# Patient Record
Sex: MALE | URBAN - METROPOLITAN AREA
[De-identification: names, ages, dates, MRNs, and addresses within clinical notes are randomized per-mention and may not be internally consistent; named-entity substitution may affect disease eponyms.]

---

## 2023-09-11 ENCOUNTER — APPOINTMENT (OUTPATIENT)
Dept: URBAN - METROPOLITAN AREA CLINIC 250 | Age: 38
Setting detail: DERMATOLOGY
End: 2023-09-12

## 2023-09-11 DIAGNOSIS — L20.89 OTHER ATOPIC DERMATITIS: ICD-10-CM

## 2023-09-11 PROCEDURE — 99203 OFFICE O/P NEW LOW 30 MIN: CPT

## 2023-09-11 PROCEDURE — OTHER PRESCRIPTION: OTHER

## 2023-09-11 PROCEDURE — OTHER COUNSELING: OTHER

## 2023-09-11 PROCEDURE — OTHER MIPS QUALITY: OTHER

## 2023-09-11 PROCEDURE — OTHER OTC TREATMENT REGIMEN: OTHER

## 2023-09-11 RX ORDER — FLUOCINONIDE 0.5 MG/G
OINTMENT TOPICAL
Qty: 60 | Refills: 0 | Status: ERX | COMMUNITY
Start: 2023-09-11

## 2023-09-11 NOTE — PROCEDURE: OTC TREATMENT REGIMEN
Detail Level: Zone
Patient Specific Otc Recommendations (Will Not Stick From Patient To Patient): Dove sensitive soap\\nAdvised CeraVe or Cetaphil cream daily within the first 5 mins after showering with Luke warm water. \\nAdvised fragrance free detergents.

## 2023-09-11 NOTE — HPI: RASH
What Type Of Note Output Would You Prefer (Optional)?: Standard Output
How Severe Is Your Rash?: moderate
Is This A New Presentation, Or A Follow-Up?: Rash
Additional History: Patient states this comes and goes over the years but this is the worse it has been. \\nPreviously used otc creams with minimal improvement.\\nNo hx of eczema. \\nPt works outside, does not apply spf.

## 2023-10-26 ENCOUNTER — APPOINTMENT (OUTPATIENT)
Dept: URBAN - METROPOLITAN AREA CLINIC 250 | Age: 38
Setting detail: DERMATOLOGY
End: 2023-10-26

## 2023-10-26 DIAGNOSIS — B35.4 TINEA CORPORIS: ICD-10-CM

## 2023-10-26 PROCEDURE — OTHER MIPS QUALITY: OTHER

## 2023-10-26 PROCEDURE — OTHER COUNSELING: OTHER

## 2023-10-26 PROCEDURE — OTHER PRESCRIPTION: OTHER

## 2023-10-26 PROCEDURE — 99213 OFFICE O/P EST LOW 20 MIN: CPT

## 2023-10-26 PROCEDURE — OTHER TREATMENT REGIMEN: OTHER

## 2023-10-26 RX ORDER — NAFTIFINE HYDROCHLORIDE 20 MG/G
1 CREAM TOPICAL BID
Qty: 60 | Refills: 2 | Status: CANCELLED | COMMUNITY
Start: 2023-10-26

## 2023-10-26 RX ORDER — TERBINAFINE HYDROCHLORIDE 250 MG/1
TABLET ORAL DAILY
Qty: 30 | Refills: 0 | Status: ERX | COMMUNITY
Start: 2023-10-26

## 2023-10-26 ASSESSMENT — LOCATION DETAILED DESCRIPTION DERM
LOCATION DETAILED: LEFT ANTERIOR DISTAL UPPER ARM
LOCATION DETAILED: LEFT DORSAL GREAT TOE
LOCATION DETAILED: LEFT VENTRAL LATERAL DISTAL FOREARM

## 2023-10-26 ASSESSMENT — LOCATION SIMPLE DESCRIPTION DERM
LOCATION SIMPLE: LEFT GREAT TOE
LOCATION SIMPLE: LEFT UPPER ARM
LOCATION SIMPLE: LEFT FOREARM

## 2023-10-26 ASSESSMENT — LOCATION ZONE DERM
LOCATION ZONE: TOE
LOCATION ZONE: ARM

## 2023-10-26 NOTE — PROCEDURE: TREATMENT REGIMEN
Plan: Continue to use Cetaphil and CeraVe moisturizer creams
Detail Level: Zone
Initiate Treatment: terbinafine HCl 250 mg tablet Daily\\nQuantity: 30.0 Tablet  Days Supply: 14\\nSig: Take one tablet daily x 1 month\\n\\nnaftifine 2 % topical cream BID\\nQuantity: 60.0 g  Days Supply: 30\\nSig: Apply to affected areas on the body twice daily x 2 weeks
Discontinue Regimen: fluocinonide 0.05 % topical ointment \\nQuantity: 60.0 g  Days Supply: 30\\nSig: Apply to the left arm, hand and left foot BID X 4 weeks then stop.

## 2023-10-30 ENCOUNTER — RX ONLY (RX ONLY)
Age: 38
End: 2023-10-30

## 2023-10-30 RX ORDER — CICLOPIROX OLAMINE 7.7 MG/G
1 CREAM TOPICAL BID
Qty: 90 | Refills: 1 | Status: ERX | COMMUNITY
Start: 2023-10-30